# Patient Record
Sex: MALE | Race: WHITE | NOT HISPANIC OR LATINO | Employment: FULL TIME | URBAN - METROPOLITAN AREA
[De-identification: names, ages, dates, MRNs, and addresses within clinical notes are randomized per-mention and may not be internally consistent; named-entity substitution may affect disease eponyms.]

---

## 2022-03-31 ENCOUNTER — HOSPITAL ENCOUNTER (EMERGENCY)
Facility: HOSPITAL | Age: 52
Discharge: HOME | End: 2022-03-31
Attending: EMERGENCY MEDICINE | Admitting: EMERGENCY MEDICINE
Payer: COMMERCIAL

## 2022-03-31 ENCOUNTER — APPOINTMENT (EMERGENCY)
Dept: CT IMAGING | Facility: HOSPITAL | Age: 52
End: 2022-03-31
Payer: COMMERCIAL

## 2022-03-31 VITALS
WEIGHT: 230 LBS | DIASTOLIC BLOOD PRESSURE: 79 MMHG | BODY MASS INDEX: 32.93 KG/M2 | HEIGHT: 70 IN | RESPIRATION RATE: 18 BRPM | SYSTOLIC BLOOD PRESSURE: 148 MMHG | TEMPERATURE: 98.1 F | OXYGEN SATURATION: 95 % | HEART RATE: 60 BPM

## 2022-03-31 DIAGNOSIS — S06.0X0A CONCUSSION WITHOUT LOSS OF CONSCIOUSNESS, INITIAL ENCOUNTER: Primary | ICD-10-CM

## 2022-03-31 DIAGNOSIS — S09.90XA INJURY OF HEAD, INITIAL ENCOUNTER: ICD-10-CM

## 2022-03-31 LAB
ANION GAP SERPL CALCULATED.3IONS-SCNC: 9 MMOL/L (ref 4–13)
APTT PPP: 29 SECONDS (ref 23–37)
ATRIAL RATE: 54 BPM
BASOPHILS # BLD AUTO: 0.04 THOUSANDS/ΜL (ref 0–0.1)
BASOPHILS NFR BLD AUTO: 1 % (ref 0–1)
BUN SERPL-MCNC: 12 MG/DL (ref 5–25)
CALCIUM SERPL-MCNC: 9 MG/DL (ref 8.3–10.1)
CHLORIDE SERPL-SCNC: 104 MMOL/L (ref 100–108)
CO2 SERPL-SCNC: 27 MMOL/L (ref 21–32)
CREAT SERPL-MCNC: 1.06 MG/DL (ref 0.6–1.3)
EOSINOPHIL # BLD AUTO: 0.4 THOUSAND/ΜL (ref 0–0.61)
EOSINOPHIL NFR BLD AUTO: 5 % (ref 0–6)
ERYTHROCYTE [DISTWIDTH] IN BLOOD BY AUTOMATED COUNT: 13.7 % (ref 11.6–15.1)
GFR SERPL CREATININE-BSD FRML MDRD: 79 ML/MIN/1.73SQ M
GLUCOSE SERPL-MCNC: 85 MG/DL (ref 65–140)
HCT VFR BLD AUTO: 41.6 % (ref 36.5–49.3)
HGB BLD-MCNC: 14.6 G/DL (ref 12–17)
IMM GRANULOCYTES # BLD AUTO: 0.04 THOUSAND/UL (ref 0–0.2)
IMM GRANULOCYTES NFR BLD AUTO: 1 % (ref 0–2)
INR PPP: 1.07 (ref 0.84–1.19)
LYMPHOCYTES # BLD AUTO: 2.32 THOUSANDS/ΜL (ref 0.6–4.47)
LYMPHOCYTES NFR BLD AUTO: 27 % (ref 14–44)
MCH RBC QN AUTO: 29.6 PG (ref 26.8–34.3)
MCHC RBC AUTO-ENTMCNC: 35.1 G/DL (ref 31.4–37.4)
MCV RBC AUTO: 84 FL (ref 82–98)
MONOCYTES # BLD AUTO: 0.58 THOUSAND/ΜL (ref 0.17–1.22)
MONOCYTES NFR BLD AUTO: 7 % (ref 4–12)
NEUTROPHILS # BLD AUTO: 5.25 THOUSANDS/ΜL (ref 1.85–7.62)
NEUTS SEG NFR BLD AUTO: 59 % (ref 43–75)
NRBC BLD AUTO-RTO: 0 /100 WBCS
P AXIS: 14 DEGREES
PLATELET # BLD AUTO: 224 THOUSANDS/UL (ref 149–390)
PMV BLD AUTO: 10.5 FL (ref 8.9–12.7)
POTASSIUM SERPL-SCNC: 3.8 MMOL/L (ref 3.5–5.3)
PR INTERVAL: 154 MS
PROTHROMBIN TIME: 13.5 SECONDS (ref 11.6–14.5)
QRS AXIS: 64 DEGREES
QRSD INTERVAL: 98 MS
QT INTERVAL: 456 MS
QTC INTERVAL: 432 MS
RBC # BLD AUTO: 4.93 MILLION/UL (ref 3.88–5.62)
SODIUM SERPL-SCNC: 140 MMOL/L (ref 136–145)
T WAVE AXIS: -15 DEGREES
VENTRICULAR RATE: 54 BPM
WBC # BLD AUTO: 8.63 THOUSAND/UL (ref 4.31–10.16)

## 2022-03-31 PROCEDURE — 93005 ELECTROCARDIOGRAM TRACING: CPT

## 2022-03-31 PROCEDURE — 99284 EMERGENCY DEPT VISIT MOD MDM: CPT | Performed by: SURGERY

## 2022-03-31 PROCEDURE — 96361 HYDRATE IV INFUSION ADD-ON: CPT

## 2022-03-31 PROCEDURE — 80048 BASIC METABOLIC PNL TOTAL CA: CPT | Performed by: SURGERY

## 2022-03-31 PROCEDURE — 72125 CT NECK SPINE W/O DYE: CPT

## 2022-03-31 PROCEDURE — 99284 EMERGENCY DEPT VISIT MOD MDM: CPT

## 2022-03-31 PROCEDURE — 72128 CT CHEST SPINE W/O DYE: CPT

## 2022-03-31 PROCEDURE — 85730 THROMBOPLASTIN TIME PARTIAL: CPT | Performed by: SURGERY

## 2022-03-31 PROCEDURE — 72131 CT LUMBAR SPINE W/O DYE: CPT

## 2022-03-31 PROCEDURE — 85610 PROTHROMBIN TIME: CPT | Performed by: SURGERY

## 2022-03-31 PROCEDURE — 36415 COLL VENOUS BLD VENIPUNCTURE: CPT | Performed by: SURGERY

## 2022-03-31 PROCEDURE — 85025 COMPLETE CBC W/AUTO DIFF WBC: CPT | Performed by: SURGERY

## 2022-03-31 PROCEDURE — 96374 THER/PROPH/DIAG INJ IV PUSH: CPT

## 2022-03-31 PROCEDURE — 70450 CT HEAD/BRAIN W/O DYE: CPT

## 2022-03-31 PROCEDURE — 93010 ELECTROCARDIOGRAM REPORT: CPT | Performed by: INTERNAL MEDICINE

## 2022-03-31 RX ORDER — ESOMEPRAZOLE MAGNESIUM 40 MG/1
40 CAPSULE, DELAYED RELEASE ORAL
COMMUNITY

## 2022-03-31 RX ORDER — NAPROXEN 500 MG/1
500 TABLET ORAL 2 TIMES DAILY WITH MEALS
Qty: 10 TABLET | Refills: 0 | Status: SHIPPED | OUTPATIENT
Start: 2022-03-31 | End: 2022-04-05

## 2022-03-31 RX ORDER — VENLAFAXINE HYDROCHLORIDE 37.5 MG/1
37.5 CAPSULE, EXTENDED RELEASE ORAL DAILY
COMMUNITY

## 2022-03-31 RX ORDER — KETOROLAC TROMETHAMINE 30 MG/ML
15 INJECTION, SOLUTION INTRAMUSCULAR; INTRAVENOUS ONCE
Status: COMPLETED | OUTPATIENT
Start: 2022-03-31 | End: 2022-03-31

## 2022-03-31 RX ORDER — DILTIAZEM HYDROCHLORIDE 120 MG/1
120 TABLET, FILM COATED ORAL DAILY
COMMUNITY

## 2022-03-31 RX ADMIN — SODIUM CHLORIDE 1000 ML: 9 INJECTION, SOLUTION INTRAVENOUS at 21:27

## 2022-03-31 RX ADMIN — KETOROLAC TROMETHAMINE 15 MG: 30 INJECTION, SOLUTION INTRAMUSCULAR at 21:26

## 2022-04-01 NOTE — DISCHARGE INSTRUCTIONS
Please follow-up with PCP and neurology within the next week  Avoid any activities that may result in any head injury  Refrain from any sports for a minimum of 6 weeks

## 2022-04-04 NOTE — ED PROVIDER NOTES
History  Chief Complaint   Patient presents with    Head Injury     Riding [employer] ride, collided into someone with head, reports neck cracked when he hit into them  Right eye blurry  A&Ox4     Sidney Nguyễn is a 47 y o  male with no pertinent past medical history presenting today status post water park incident  The patient reports that he was going down a slide when he collided on to somebody else who got stuck on the slide  The patient reports that he hit his head and felt a crack sensation when he had the other person  Complained of some blurry vision bilateral eyes  Denies any loss of consciousness  The patient was alert and oriented X 4  Denies any numbness or tingling in the extremity  Denies any saddle anesthesia, urinary incontinence bowel incontinence  Denies any other numbness or weakness in the extremities  Mild headache which he rates a 4/10 in severity, no worsening of headache  Patient denies any other symptoms at this time  No further complaints      Trauma: Evaluation/Alert      Mechanism of Injury: Water slide incident  LOC:  None  Airway:  patent  Breathing:  Bilateral breath sounds bilaterally  Circulation: normal  Disability: normal  Exposure: full        Numbers; 3-15 (gcs): 15      none      NEXUS:   Neuro Deficit  Spinal Tenderness (Midline)  Altered Mental Status/Level of Consciousness  Intoxication  Distracting Injury    If No C Spine cleared:      Pt Direct To CT: yes                    Prior to Admission Medications   Prescriptions Last Dose Informant Patient Reported? Taking?   diltiazem (CARDIZEM) 120 MG tablet   Yes Yes   Sig: Take 120 mg by mouth daily   esomeprazole (NexIUM) 40 MG capsule   Yes Yes   Sig: Take 40 mg by mouth every morning before breakfast   venlafaxine (EFFEXOR-XR) 37 5 mg 24 hr capsule   Yes Yes   Sig: Take 37 5 mg by mouth daily      Facility-Administered Medications: None       History reviewed  No pertinent past medical history      History reviewed  No pertinent surgical history  History reviewed  No pertinent family history  I have reviewed and agree with the history as documented  E-Cigarette/Vaping     E-Cigarette/Vaping Substances     Social History     Tobacco Use    Smoking status: Never Smoker    Smokeless tobacco: Never Used   Substance Use Topics    Alcohol use: Yes    Drug use: Not on file       Review of Systems   Constitutional: Negative for activity change, chills, diaphoresis and fever  HENT: Negative for congestion, ear discharge, ear pain, rhinorrhea, sore throat and trouble swallowing  Eyes: Negative for pain, discharge, redness and visual disturbance  Respiratory: Negative for cough, chest tightness, shortness of breath and wheezing  Cardiovascular: Negative for chest pain, palpitations and leg swelling  Gastrointestinal: Negative for abdominal distention, abdominal pain, blood in stool, constipation, diarrhea, nausea and vomiting  Genitourinary: Negative for difficulty urinating, dysuria, flank pain, frequency, hematuria and urgency  Musculoskeletal: Positive for neck pain  Negative for arthralgias, myalgias and neck stiffness  Skin: Negative for color change and rash  Neurological: Positive for headaches  Negative for dizziness, syncope, facial asymmetry, weakness, light-headedness and numbness  Physical Exam  Physical Exam  Vitals reviewed  Constitutional:       General: He is not in acute distress  Appearance: Normal appearance  He is not ill-appearing  HENT:      Head: Normocephalic and atraumatic  Right Ear: Tympanic membrane normal       Left Ear: Tympanic membrane normal       Nose: Nose normal  No congestion or rhinorrhea  Mouth/Throat:      Mouth: Mucous membranes are moist       Pharynx: Oropharynx is clear  No oropharyngeal exudate or posterior oropharyngeal erythema  Eyes:      Extraocular Movements: Extraocular movements intact        Conjunctiva/sclera: Conjunctivae normal       Pupils: Pupils are equal, round, and reactive to light  Cardiovascular:      Rate and Rhythm: Normal rate and regular rhythm  Pulses: Normal pulses  Heart sounds: Normal heart sounds  Pulmonary:      Effort: Pulmonary effort is normal  No respiratory distress  Breath sounds: Normal breath sounds  No wheezing  Abdominal:      General: Abdomen is flat  Bowel sounds are normal  There is no distension  Palpations: Abdomen is soft  There is no mass  Tenderness: There is no abdominal tenderness  There is no right CVA tenderness, left CVA tenderness or guarding  Hernia: No hernia is present  Musculoskeletal:         General: No swelling, tenderness or deformity  Normal range of motion  Cervical back: Normal range of motion and neck supple  No rigidity or tenderness  Right lower leg: No edema  Left lower leg: No edema  Skin:     General: Skin is warm and dry  Capillary Refill: Capillary refill takes less than 2 seconds  Coloration: Skin is not jaundiced  Findings: No erythema or rash  Neurological:      General: No focal deficit present  Mental Status: He is alert and oriented to person, place, and time  GCS: GCS eye subscore is 4  GCS verbal subscore is 5  GCS motor subscore is 6  Cranial Nerves: No cranial nerve deficit  Sensory: No sensory deficit  Motor: No weakness, tremor, atrophy, abnormal muscle tone, seizure activity or pronator drift  Coordination: Coordination is intact  Romberg sign negative   Coordination normal  Finger-Nose-Finger Test and Heel to [facility] Test normal  Rapid alternating movements normal       Gait: Gait and tandem walk normal       Deep Tendon Reflexes: Reflexes normal          Vital Signs  ED Triage Vitals [03/31/22 1859]   Temperature Pulse Respirations Blood Pressure SpO2   98 1 °F (36 7 °C) 62 16 (!) 169/103 98 %      Temp Source Heart Rate Source Patient Position - Orthostatic VS BP Location FiO2 (%)   Oral Monitor Sitting Left arm --      Pain Score       5           Vitals:    03/31/22 2130 03/31/22 2230 03/31/22 2300 03/31/22 2336   BP: 157/95 (!) 175/104 151/96 148/79   Pulse: (!) 51 55 55 60   Patient Position - Orthostatic VS: Lying Lying Lying Lying         Visual Acuity  Visual Acuity      Most Recent Value   Visual acuity R eye is 20/20   Visual acuity Left eye is 20/20   Visual acuity in both eyes is 20/25   Wearing corrective eyewear/lenses?  No   L Pupil Size (mm) 4   R Pupil Size (mm) 5          ED Medications  Medications   sodium chloride 0 9 % bolus 1,000 mL (0 mL Intravenous Stopped 3/31/22 2238)   ketorolac (TORADOL) injection 15 mg (15 mg Intravenous Given 3/31/22 2126)       Diagnostic Studies  Results Reviewed     Procedure Component Value Units Date/Time    Protime-INR [[id number]]  (Normal) Collected: 03/31/22 2057    Lab Status: Final result Specimen: Blood from Arm, Right Updated: 03/31/22 2120     Protime 13 5 seconds      INR 1 07    APTT [[id number]]  (Normal) Collected: 03/31/22 2057    Lab Status: Final result Specimen: Blood from Arm, Right Updated: 03/31/22 2120     PTT 29 seconds     Basic metabolic panel [[id number]] Collected: 03/31/22 2057    Lab Status: Final result Specimen: Blood from Arm, Right Updated: 03/31/22 2120     Sodium 140 mmol/L      Potassium 3 8 mmol/L      Chloride 104 mmol/L      CO2 27 mmol/L      ANION GAP 9 mmol/L      BUN 12 mg/dL      Creatinine 1 06 mg/dL      Glucose 85 mg/dL      Calcium 9 0 mg/dL      eGFR 79 ml/min/1 73sq m     Narrative:      [facility] guidelines for Chronic Kidney Disease (CKD):     Stage 1 with normal or high GFR (GFR > 90 mL/min/1 73 square meters)    Stage 2 Mild CKD (GFR = 60-89 mL/min/1 73 square meters)    Stage 3A Moderate CKD (GFR = 45-59 mL/min/1 73 square meters)    Stage 3B Moderate CKD (GFR = 30-44 mL/min/1 73 square meters)    Stage 4 Severe CKD (GFR = 15-29 mL/min/1 73 square meters)    Stage 5 End Stage CKD (GFR <15 mL/min/1 73 square meters)  Note: GFR calculation is accurate only with a steady state creatinine    CBC and differential [[id number]] Collected: 03/31/22 2057    Lab Status: Final result Specimen: Blood from Arm, Right Updated: 03/31/22 2106     WBC 8 63 Thousand/uL      RBC 4 93 Million/uL      Hemoglobin 14 6 g/dL      Hematocrit 41 6 %      MCV 84 fL      MCH 29 6 pg      MCHC 35 1 g/dL      RDW 13 7 %      MPV 10 5 fL      Platelets 048 Thousands/uL      nRBC 0 /100 WBCs      Neutrophils Relative 59 %      Immat GRANS % 1 %      Lymphocytes Relative 27 %      Monocytes Relative 7 %      Eosinophils Relative 5 %      Basophils Relative 1 %      Neutrophils Absolute 5 25 Thousands/µL      Immature Grans Absolute 0 04 Thousand/uL      Lymphocytes Absolute 2 32 Thousands/µL      Monocytes Absolute 0 58 Thousand/µL      Eosinophils Absolute 0 40 Thousand/µL      Basophils Absolute 0 04 Thousands/µL                  TRAUMA - CT head wo contrast   Final Result by Coralee Ganser, MD (03/31 2141)      No acute intracranial abnormality  Diffuse sinus mucosal thickening with right maxillary sinus air-fluid level consistent with acute on chronic sinusitis  Workstation performed: [id number]         TRAUMA - CT spine cervical wo contrast   Final Result by Coralee Ganser, MD (03/31 2139)      No cervical spine fracture or traumatic malalignment  Workstation performed: [id number]         CT spine thoracic & lumbar wo contrast   Final Result by Coralee Ganser, MD (03/31 2136)      No acute post traumatic abnormality detected in the thoracic or lumbar spine              Workstation performed: [id number]                    Procedures  Procedures         ED Course  ED Course as of 04/04/22 0529   u Mar 31, 2022   2300 IOP checked, normal in both eyes 17mmHg                               SBIRT 22yo+      Most Recent Value   SBIRT (24 yo +)    In order to provide better care to our patients, we are screening all of our patients for alcohol and drug use  Would it be okay to ask you these screening questions? Yes Filed at: 03/31/2022 2025   Initial Alcohol Screen: US AUDIT-C     1  How often do you have a drink containing alcohol? 1 Filed at: 03/31/2022 2025   2  How many drinks containing alcohol do you have on a typical day you are drinking? 1 Filed at: 03/31/2022 2025   3a  Male UNDER 65: How often do you have five or more drinks on one occasion? 0 Filed at: 03/31/2022 2025   Audit-C Score 2 Filed at: 03/31/2022 2025   DIONE: How many times in the past year have you    Used an illegal drug or used a prescription medication for non-medical reasons? Never Filed at: 03/31/2022 2025                    MDM  Number of Diagnoses or Management Options  Concussion without loss of consciousness, initial encounter: minor  Injury of head, initial encounter: new and requires workup  Diagnosis management comments: Patient with head injury just prior to arrival   No loss of consciousness on any blood thinners  CT scan of the patient's head was negative for any acute intracranial hemorrhage or other abnormalities  I discussed finding with the patient  Recommended close follow-up with concussion clinic  The patient reports that he lives in [place], will follow up with a neurologist near his home  I educated on the importance of follow-up  I also reiterated with the patient that he should refrain from any physical activity, explain to him that there are serious risks for 2nd impact syndrome if he were to have another head injury  He demonstrated understanding  All questions were answered appropriately  Strict return criteria were given to the patient  He demonstrated understanding         Amount and/or Complexity of Data Reviewed  Clinical lab tests: ordered and reviewed  Tests in the radiology section of CPT®: ordered and reviewed  Obtain history from someone other than the patient: yes  Review and summarize past medical records: yes  Discuss the patient with other providers: yes  Independent visualization of images, tracings, or specimens: yes    Risk of Complications, Morbidity, and/or Mortality  Presenting problems: moderate  Diagnostic procedures: moderate  Management options: moderate  General comments: Visual acuity testing revealed normal vision  2020 and left eye, 2020 in right eye, 20/25 both    Patient Progress  Patient progress: improved      Disposition  Final diagnoses:   Injury of head, initial encounter   Concussion without loss of consciousness, initial encounter     Time reflects when diagnosis was documented in both MDM as applicable and the Disposition within this note     Time User Action Codes Description Comment    3/31/2022 11:00 PM Pam Combs Add [S09 90XA] Injury of head, initial encounter     3/31/2022 11:00 PM Pam Combs Add [S06 0X0A] Concussion without loss of consciousness, initial encounter     3/31/2022 11:04 PM Pam Combs Modify [S09 90XA] Injury of head, initial encounter     3/31/2022 11:04 PM Pam Combs Modify [S06 0X0A] Concussion without loss of consciousness, initial encounter       ED Disposition     ED Disposition Condition Date/Time Comment    Discharge Stable Thu Mar 31, 2022 [address] discharge to home/self care              Follow-up Information     Follow up With Specialties Details Why Contact Info Additional 2000 [facility] Emergency Medicine Go to  If symptoms worsen 34 [facility] 109 [facility] Emergency Department, [address] [place] [address], [id number]    Neurology [facility] Neurology Call today To schedule follow-up appointment [facility]  [phone] [facility] [address], [address] [facility]          Discharge Medication List as of 3/31/2022 11:04 PM      START taking these medications    Details   naproxen (Naprosyn) 500 mg tablet Take 1 tablet (500 mg total) by mouth 2 (two) times a day with meals for 5 days, Starting Thu 3/31/2022, Until Tue 4/5/2022, Print         CONTINUE these medications which have NOT CHANGED    Details   diltiazem (CARDIZEM) 120 MG tablet Take 120 mg by mouth daily, Historical Med      esomeprazole (NexIUM) 40 MG capsule Take 40 mg by mouth every morning before breakfast, Historical Med      venlafaxine (EFFEXOR-XR) 37 5 mg 24 hr capsule Take 37 5 mg by mouth daily, Historical Med                 PDMP Review     None          ED Provider  Electronically Signed by           Kinza Sabillon PA-C  04/04/22 2666